# Patient Record
Sex: FEMALE | Race: WHITE | URBAN - METROPOLITAN AREA
[De-identification: names, ages, dates, MRNs, and addresses within clinical notes are randomized per-mention and may not be internally consistent; named-entity substitution may affect disease eponyms.]

---

## 2021-06-14 ENCOUNTER — OFFICE VISIT (OUTPATIENT)
Dept: URBAN - METROPOLITAN AREA CLINIC 88 | Facility: CLINIC | Age: 61
End: 2021-06-14
Payer: COMMERCIAL

## 2021-06-14 DIAGNOSIS — R42 DIZZINESS: ICD-10-CM

## 2021-06-14 DIAGNOSIS — H25.13 AGE-RELATED NUCLEAR CATARACT, BILATERAL: Primary | ICD-10-CM

## 2021-06-14 PROCEDURE — 99203 OFFICE O/P NEW LOW 30 MIN: CPT | Performed by: OPHTHALMOLOGY

## 2021-06-14 ASSESSMENT — INTRAOCULAR PRESSURE
OS: 14
OD: 14

## 2021-06-14 ASSESSMENT — VISUAL ACUITY: OD: 20/25

## 2021-06-14 ASSESSMENT — KERATOMETRY
OS: 40.88
OD: 39.88

## 2021-06-14 NOTE — IMPRESSION/PLAN
Impression: Diplopia: H53.2. Plan: Discussed eye findings with patient.  If Diplopia persists we need to follow up with the Optometrist for glasses w/Prism since the appointment with Dr. Mejia Castañeda (November 2021) and patient is asymptotic

## 2021-07-07 ENCOUNTER — TESTING ONLY (OUTPATIENT)
Dept: URBAN - METROPOLITAN AREA CLINIC 88 | Facility: CLINIC | Age: 61
End: 2021-07-07

## 2021-07-07 DIAGNOSIS — H52.4 PRESBYOPIA: ICD-10-CM

## 2021-07-07 DIAGNOSIS — H50.51 ESOPHORIA: Primary | ICD-10-CM

## 2021-07-07 DIAGNOSIS — H53.2 DIPLOPIA: ICD-10-CM

## 2021-07-07 ASSESSMENT — VISUAL ACUITY
OD: 20/25
OS: 20/25

## 2021-07-07 NOTE — IMPRESSION/PLAN
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 Plan: Esophoria 6pd, divergence 3/-3 pd. New Rx today. Patient educated to mild decompensating phoria and associated symptoms.

## 2021-07-07 NOTE — IMPRESSION/PLAN
Impression: Diplopia: H53.2. Plan: See above. Continue care with ENT. RTC if any new symptoms experienced.

## 2022-08-08 ENCOUNTER — OFFICE VISIT (OUTPATIENT)
Dept: URBAN - METROPOLITAN AREA CLINIC 88 | Facility: CLINIC | Age: 62
End: 2022-08-08
Payer: COMMERCIAL

## 2022-08-08 DIAGNOSIS — H43.813 VITREOUS DEGENERATION, BILATERAL: ICD-10-CM

## 2022-08-08 DIAGNOSIS — H52.4 PRESBYOPIA: ICD-10-CM

## 2022-08-08 DIAGNOSIS — H50.51 ESOPHORIA: ICD-10-CM

## 2022-08-08 DIAGNOSIS — H25.13 AGE-RELATED NUCLEAR CATARACT, BILATERAL: Primary | ICD-10-CM

## 2022-08-08 PROCEDURE — 92014 COMPRE OPH EXAM EST PT 1/>: CPT | Performed by: OPTOMETRIST

## 2022-08-08 ASSESSMENT — VISUAL ACUITY
OD: 20/25
OS: 20/40

## 2022-08-08 ASSESSMENT — INTRAOCULAR PRESSURE
OS: 14
OD: 14

## 2022-08-08 ASSESSMENT — KERATOMETRY
OS: 40.63
OD: 40.25

## 2022-08-08 NOTE — IMPRESSION/PLAN
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 Plan: Mild decompensation with reduced vergence ability. Low vertical component. New Prism Rx today. Symptoms improved. RTC if any new symptoms experienced.

## 2023-08-10 ENCOUNTER — OFFICE VISIT (OUTPATIENT)
Dept: URBAN - METROPOLITAN AREA CLINIC 88 | Facility: CLINIC | Age: 63
End: 2023-08-10
Payer: COMMERCIAL

## 2023-08-10 DIAGNOSIS — H25.13 AGE-RELATED NUCLEAR CATARACT, BILATERAL: Primary | ICD-10-CM

## 2023-08-10 DIAGNOSIS — H17.89 OTHER CORNEAL SCARS AND OPACITIES: ICD-10-CM

## 2023-08-10 DIAGNOSIS — H43.813 VITREOUS DEGENERATION, BILATERAL: ICD-10-CM

## 2023-08-10 DIAGNOSIS — H35.371 PUCKERING OF MACULA, RIGHT EYE: ICD-10-CM

## 2023-08-10 PROCEDURE — 92134 CPTRZ OPH DX IMG PST SGM RTA: CPT | Performed by: OPTOMETRIST

## 2023-08-10 PROCEDURE — 99213 OFFICE O/P EST LOW 20 MIN: CPT | Performed by: OPTOMETRIST

## 2023-08-10 ASSESSMENT — VISUAL ACUITY
OD: 20/25
OS: 20/25

## 2023-08-10 ASSESSMENT — INTRAOCULAR PRESSURE
OS: 14
OD: 14

## 2025-02-05 ENCOUNTER — OFFICE VISIT (OUTPATIENT)
Dept: URBAN - METROPOLITAN AREA CLINIC 89 | Facility: CLINIC | Age: 65
End: 2025-02-05
Payer: COMMERCIAL

## 2025-02-05 DIAGNOSIS — H35.371 PUCKERING OF MACULA, RIGHT EYE: Primary | ICD-10-CM

## 2025-02-05 DIAGNOSIS — H25.13 AGE-RELATED NUCLEAR CATARACT, BILATERAL: ICD-10-CM

## 2025-02-05 DIAGNOSIS — H52.4 PRESBYOPIA: ICD-10-CM

## 2025-02-05 PROCEDURE — 92134 CPTRZ OPH DX IMG PST SGM RTA: CPT | Performed by: OPTOMETRIST

## 2025-02-05 PROCEDURE — 92004 COMPRE OPH EXAM NEW PT 1/>: CPT | Performed by: OPTOMETRIST

## 2025-02-05 ASSESSMENT — KERATOMETRY
OD: 40.20
OS: 40.40

## 2025-02-05 ASSESSMENT — INTRAOCULAR PRESSURE
OD: 11
OS: 14